# Patient Record
Sex: MALE | Race: WHITE | NOT HISPANIC OR LATINO | Employment: FULL TIME | URBAN - METROPOLITAN AREA
[De-identification: names, ages, dates, MRNs, and addresses within clinical notes are randomized per-mention and may not be internally consistent; named-entity substitution may affect disease eponyms.]

---

## 2018-06-25 ENCOUNTER — OFFICE VISIT (OUTPATIENT)
Dept: URGENT CARE | Facility: MEDICAL CENTER | Age: 23
End: 2018-06-25
Payer: COMMERCIAL

## 2018-06-25 VITALS
HEIGHT: 70 IN | OXYGEN SATURATION: 100 % | TEMPERATURE: 96.7 F | WEIGHT: 193 LBS | HEART RATE: 54 BPM | SYSTOLIC BLOOD PRESSURE: 143 MMHG | RESPIRATION RATE: 16 BRPM | BODY MASS INDEX: 27.63 KG/M2 | DIASTOLIC BLOOD PRESSURE: 99 MMHG

## 2018-06-25 DIAGNOSIS — N62 SUBAREOLAR GYNECOMASTIA IN MALE: Primary | ICD-10-CM

## 2018-06-25 PROCEDURE — 99203 OFFICE O/P NEW LOW 30 MIN: CPT | Performed by: FAMILY MEDICINE

## 2018-06-26 NOTE — PROGRESS NOTES
[facility]        NAME: Huey Richardson is a 25 y o  male  : 1995    MRN: [id number]  DATE: 2018  TIME: 8:36 PM    Assessment and Plan   Subareolar gynecomastia in male [N62]  1  Subareolar gynecomastia in male           Patient Instructions       Follow up with PCP in 3-5 days  Proceed to  ER if symptoms worsen  Chief Complaint     Chief Complaint   Patient presents with    Breast Pain     Pt states that he has found lump in right breast a few weeks ago  States that it is becoming larger and painful  History of Present Illness       Patient complaining of right breast tenderness for the past 4 weeks  Denies any recent trauma or swelling or bruising  However the last several days his become extremely tender  He has noticed some increased in size of the nipple area  Denies any fever or chills accompanying this swelling  No previous history of similar swelling in the past         Review of Systems   Review of Systems   Constitutional: Negative  Current Medications     No current outpatient prescriptions on file  Current Allergies     Allergies as of 2018    (No Known Allergies)            The following portions of the patient's history were reviewed and updated as appropriate: allergies, current medications, past family history, past medical history, past social history, past surgical history and problem list      History reviewed  No pertinent past medical history  History reviewed  No pertinent surgical history  No family history on file  Medications have been verified  Objective   /99   Pulse (!) 54   Temp (!) 96 7 °F (35 9 °C)   Resp 16   Ht 5' 10" (1 778 m)   Wt 87 5 kg (193 lb)   SpO2 100%   BMI 27 69 kg/m²        Physical Exam     Physical Exam   Constitutional: He appears well-developed and well-nourished     Neck:   No evidence of supra or infraclavicular lymphadenopathy on physical exam   Pulmonary/Chest: Effort normal    Right breast-subareolar reveals a palpable mass which is mobile, measuring about 2 cm in size  It is not fixed  Tender to touch  Lymphadenopathy:     He has no cervical adenopathy  Nursing note and vitals reviewed

## 2018-06-26 NOTE — PATIENT INSTRUCTIONS
Physical exam confirms gynecomastia of the right areola area  Patient advised to follow up with primary care provider request ultrasound soon  Advised patient to take analgesics such as Tylenol or ibuprofen as needed  Gynecomastia   WHAT YOU NEED TO KNOW:   Gynecomastia is enlarged breast tissue or glands in men or boys  The enlargement is from an imbalance between testosterone (male hormone) and estrogen (female hormone)  One or both breasts may be affected  Gynecomastia may be a sign of a serious disease that needs to be treated  DISCHARGE INSTRUCTIONS:   Medicines:   · Pain medicine  may be prescribed or suggested by your healthcare provider  Ask for information on how to take the medicine safely  · Take your medicine as directed  Contact your healthcare provider if you think your medicine is not helping or if you have side effects  Tell him of her if you are allergic to any medicine  Keep a list of the medicines, vitamins, and herbs you take  Include the amounts, and when and why you take them  Bring the list or the pill bottles to follow-up visits  Carry your medicine list with you in case of an emergency  Follow up with your healthcare provider as directed: You may need to come in every 3 to 6 months or until your symptoms stop  Write down your questions so you remember to ask them during your visits  Apply a cold compress:  A cold compress can help relieve soreness or tenderness  Use an ice pack, or put crushed ice in a plastic bag  Cover the pack or bag with a towel and apply it to your breasts as often and for as long as directed  Prevent gynecomastia:   · Do not use illegal drugs  Ask your healthcare provider for information if you need help quitting  · Limit or do not drink alcohol as directed  A drink of alcohol is 12 ounces of beer, 1½ ounces of liquor, or 5 ounces of wine  · Maintain a healthy weight  Ask your healthcare provider how much you should weigh   Ask him to help you create a weight loss plan if you are overweight  Contact your healthcare provider if:   · You have breast pain or soreness that is not helped with medicine  · The skin around your nipples is peeling from rubbing against your clothing  · You feel depressed or embarrassed about your condition  · You have questions or concerns about your condition or care  © 2017 [employer] Information is for End User's use only and may not be sold, redistributed or otherwise used for commercial purposes  All illustrations and images included in CareNotes® are the copyrighted property of [employer] , [employer]  or [employer]  The above information is an  only  It is not intended as medical advice for individual conditions or treatments  Talk to your doctor, nurse or pharmacist before following any medical regimen to see if it is safe and effective for you